# Patient Record
Sex: MALE | Race: OTHER | HISPANIC OR LATINO
[De-identification: names, ages, dates, MRNs, and addresses within clinical notes are randomized per-mention and may not be internally consistent; named-entity substitution may affect disease eponyms.]

---
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
## 2022-12-20 ENCOUNTER — NON-APPOINTMENT (OUTPATIENT)
Age: 22
End: 2022-12-20

## 2023-11-02 NOTE — PATIENT PROFILE ADULT - MONEY FOR FOOD
Physical Therapy      Patient not seen in therapy today.     Order received, chart reviewed.    Spoke with RN; will attempt PT evaluation tomorrow if needed/appropriate. RN will assist parents in holding child today.    OBJECTIVE                 Therapy procedure time and total treatment time can be found documented on the Time Entry flowsheet   no

## 2025-01-29 NOTE — ED PROVIDER NOTE - PRINCIPAL DIAGNOSIS
Acute appendicitis with localized peritonitis, without perforation or abscess, unspecified whether gangrene present No

## 2025-04-08 NOTE — ED PROVIDER NOTE - CONSTITUTIONAL NEGATIVE STATEMENT, MLM
----- Message from Ema Blanco sent at 1/20/2020  2:54 PM CST -----  Contact: pt  Pt is requesting a call back from the nurse in regards to pt needed some samples of myrbetriq. Please call back at [phone]      Chart reviewed.  Patient in the OR today.  Continue anastrozole.  Will plan to see her  tomorrow.  Hemoglobin stable at 9.3   no fever and no chills.